# Patient Record
Sex: FEMALE | Race: WHITE | NOT HISPANIC OR LATINO | Employment: OTHER | URBAN - METROPOLITAN AREA
[De-identification: names, ages, dates, MRNs, and addresses within clinical notes are randomized per-mention and may not be internally consistent; named-entity substitution may affect disease eponyms.]

---

## 2020-07-22 ENCOUNTER — HOSPITAL ENCOUNTER (EMERGENCY)
Facility: MEDICAL CENTER | Age: 55
End: 2020-07-22
Attending: EMERGENCY MEDICINE

## 2020-07-22 VITALS
BODY MASS INDEX: 22.29 KG/M2 | WEIGHT: 147.05 LBS | HEIGHT: 68 IN | SYSTOLIC BLOOD PRESSURE: 107 MMHG | TEMPERATURE: 97.5 F | DIASTOLIC BLOOD PRESSURE: 87 MMHG | OXYGEN SATURATION: 96 % | RESPIRATION RATE: 20 BRPM | HEART RATE: 83 BPM

## 2020-07-22 DIAGNOSIS — T78.40XA ALLERGIC REACTION, INITIAL ENCOUNTER: ICD-10-CM

## 2020-07-22 DIAGNOSIS — L50.9 HIVES: ICD-10-CM

## 2020-07-22 PROCEDURE — 96374 THER/PROPH/DIAG INJ IV PUSH: CPT

## 2020-07-22 PROCEDURE — 700111 HCHG RX REV CODE 636 W/ 250 OVERRIDE (IP): Performed by: EMERGENCY MEDICINE

## 2020-07-22 PROCEDURE — 99284 EMERGENCY DEPT VISIT MOD MDM: CPT

## 2020-07-22 PROCEDURE — 36415 COLL VENOUS BLD VENIPUNCTURE: CPT

## 2020-07-22 PROCEDURE — 96375 TX/PRO/DX INJ NEW DRUG ADDON: CPT

## 2020-07-22 RX ORDER — DEXAMETHASONE SODIUM PHOSPHATE 10 MG/ML
10 INJECTION, SOLUTION INTRAMUSCULAR; INTRAVENOUS ONCE
Status: COMPLETED | OUTPATIENT
Start: 2020-07-22 | End: 2020-07-22

## 2020-07-22 RX ORDER — M-VIT,TX,IRON,MINS/CALC/FOLIC 27MG-0.4MG
1 TABLET ORAL DAILY
COMMUNITY

## 2020-07-22 RX ORDER — EPINEPHRINE 0.3 MG/.3ML
0.3 INJECTION SUBCUTANEOUS
Qty: 1 EACH | Refills: 10 | Status: SHIPPED | OUTPATIENT
Start: 2020-07-22 | End: 2021-07-21 | Stop reason: SDUPTHER

## 2020-07-22 RX ADMIN — FAMOTIDINE 40 MG: 10 INJECTION, SOLUTION INTRAVENOUS at 20:12

## 2020-07-22 RX ADMIN — DEXAMETHASONE SODIUM PHOSPHATE 10 MG: 10 INJECTION INTRAMUSCULAR; INTRAVENOUS at 20:14

## 2020-07-22 SDOH — HEALTH STABILITY: MENTAL HEALTH: HOW MANY STANDARD DRINKS CONTAINING ALCOHOL DO YOU HAVE ON A TYPICAL DAY?: 1 OR 2

## 2020-07-22 SDOH — HEALTH STABILITY: MENTAL HEALTH: HOW OFTEN DO YOU HAVE A DRINK CONTAINING ALCOHOL?: 2-4 TIMES A MONTH

## 2020-07-23 NOTE — ED TRIAGE NOTES
"Chief Complaint   Patient presents with   • Allergic Reaction     thinks she was bitten by a bug on right shin and now having allergic reaction. rash to armpits, chest pressure.   • Bug Bite     ED Triage Vitals [07/22/20 1938]   Enc Vitals Group      Blood Pressure 139/107      Pulse (!) 101      Respiration 20      Temperature 36.4 °C (97.5 °F)      Temp src Temporal      Pulse Oximetry 97 %      Weight 66.7 kg (147 lb 0.8 oz)      Height 1.727 m (5' 8\")     Took 2 benadryl 30 min PTA.  "

## 2020-07-23 NOTE — ED PROVIDER NOTES
ED Provider Note        Primary care provider: No primary care provider on file.    I verified that the patient was wearing a mask and I was wearing appropriate PPE every time I entered the room. The patient's mask was on the patient at all times during my encounter except for a brief view of the oropharynx.      CHIEF COMPLAINT  Chief Complaint   Patient presents with   • Allergic Reaction     thinks she was bitten by a bug on right shin and now having allergic reaction. rash to armpits, chest pressure.   • Bug Bite       HPI  Tomeka Aquino is a 54 y.o. female who presents to the Emergency Department with chief complaint of allergic reaction.  Patient was walking through the grass this evening she felt a sting on the anterior aspect of her right lower extremity.  Immediate pain and minor swelling at this site patient then began to have pruritus over her upper legs into her abdomen chest into her upper extremities.  She stated that she felt somewhat short of breath in presentation here but it is improving at this time she took 2 Benadryl prior to arrival.  No difficulty swallowing no difficulty breathing.  No previous history of insect allergies no food or drug allergies no other acute symptoms or concerns at this time.    REVIEW OF SYSTEMS  10 systems reviewed and otherwise negative, pertinent positives and negatives listed in the history of present illness.    PAST MEDICAL HISTORY   Patient denies    SURGICAL HISTORY  patient denies any surgical history    SOCIAL HISTORY  Social History     Tobacco Use   • Smoking status: Current Every Day Smoker     Packs/day: 0.25     Types: Cigarettes   • Smokeless tobacco: Never Used   Substance Use Topics   • Alcohol use: Yes     Frequency: 2-4 times a month     Drinks per session: 1 or 2   • Drug use: Never      Social History     Substance and Sexual Activity   Drug Use Never       FAMILY HISTORY  Non-Contributory    CURRENT MEDICATIONS  Home Medications      "Reviewed by Eliot Rosado R.N. (Registered Nurse) on 07/22/20 at 1955  Med List Status: Complete   Medication Last Dose Status   therapeutic multivitamin-minerals (THERAGRAN-M) Tab 7/22/2020 Active                ALLERGIES  No Known Allergies    PHYSICAL EXAM  VITAL SIGNS: /107   Pulse (!) 101   Temp 36.4 °C (97.5 °F) (Temporal)   Resp 20   Ht 1.727 m (5' 8\")   Wt 66.7 kg (147 lb 0.8 oz)   SpO2 97%   BMI 22.36 kg/m²   Pulse ox interpretation: I interpret this pulse ox as normal.  Constitutional: Alert and oriented x 3, minimal distress  HEENT: Atraumatic normocephalic, pupils are equal round reactive to light extraocular movements are intact. The nares is clear, external ears are normal, mouth shows moist mucous membranes  Neck: Supple, no JVD no tracheal deviation  Cardiovascular: Regular rate and rhythm no murmur rub or gallop 2+ pulses peripherally x4  Thorax & Lungs: No respiratory distress, no wheezes rales or rhonchi, No chest tenderness.   GI: Soft nontender nondistended positive bowel sounds, no peritoneal signs  Skin: Patient has approximately 10 cm area of slight induration and erythema over the right anterior shin.  She has multiple areas in the proximal thighs chest abdomen trunk and upper extremities with erythematous slightly raised lesions with central clearing and multiple areas of confluence.  No involvement of mucosal surfaces no involvement of the palms of the hands and the soles of the feet.  Musculoskeletal: Moving all extremities with full range and 5 of 5 strength, no acute  deformity  Neurologic: Cranial nerves III through XII are grossly intact, no sensory deficit, no cerebellar dysfunction   Psychiatric: Appropriate affect for situation at this time      DIAGNOSTIC STUDIES / PROCEDURES        COURSE & MEDICAL DECISION MAKING  Pertinent Labs & Imaging studies reviewed. (See chart for details)    8:18 PM - Patient seen and examined at bedside.     Patient noted to have " "slightly elevated blood pressure likely circumstantial secondary to presenting complaint. Referred to primary care physician for further evaluation.      Medical Decision Making: Patient had slight tachycardia at arrival that resolved spontaneously she took 50 mg of Benadryl prior to arrival she is given 40 mg IV Pepcid 10 mg of Decadron she was observed for 1 hour and had complete resolution of symptoms.  Given instructions return for worsening pain swelling redness shortness of breath difficulty breathing swallowing any other acute symptoms or concerns otherwise discharged in stable and improved condition.  Being that the insect was not visualized unknown she also has no other known allergies patient will be given a epinephrine autoinjector instructed on the use and indications to return to the emergency department should she have her have to use it follow-up with primary care for possible allergy testing discharged in stable and improved condition.    /87   Pulse 83   Temp 36.4 °C (97.5 °F) (Temporal)   Resp 20   Ht 1.727 m (5' 8\")   Wt 66.7 kg (147 lb 0.8 oz)   SpO2 96%   BMI 22.36 kg/m²     [address] [address]  [phone]  Schedule an appointment as soon as possible for a visit   for establishment of primary care, for blood pressure management, for possible allergy testing    [facility], Emergency Dept  [address]  [facility] [address] [phone]    If symptoms worsen      New Prescriptions    No medications on file       FINAL IMPRESSION  1. Allergic reaction, initial encounter Active   2. Hives Active         This dictation has been created using voice recognition software and/or scribes. The accuracy of the dictation is limited by the abilities of the software and the expertise of the scribes. I expect there may be some errors of grammar and possibly content. I made every attempt to manually correct the errors " within my dictation. However, errors related to voice recognition software and/or scribes may still exist and should be interpreted within the appropriate context.

## 2020-07-23 NOTE — ED NOTES
Assumed patient care. Pt assesement done.  Plan of care reviewed with patient. IV established. Blood sent to lab. Pt medicated per ERP orders.

## 2021-01-08 ENCOUNTER — TELEPHONE (OUTPATIENT)
Dept: HEALTH INFORMATION MANAGEMENT | Facility: OTHER | Age: 56
End: 2021-01-08

## 2021-02-03 ENCOUNTER — OFFICE VISIT (OUTPATIENT)
Dept: MEDICAL GROUP | Facility: MEDICAL CENTER | Age: 56
End: 2021-02-03
Attending: FAMILY MEDICINE
Payer: MEDICAID

## 2021-02-03 VITALS
SYSTOLIC BLOOD PRESSURE: 112 MMHG | TEMPERATURE: 97.1 F | BODY MASS INDEX: 23.85 KG/M2 | HEIGHT: 69 IN | HEART RATE: 74 BPM | WEIGHT: 161 LBS | RESPIRATION RATE: 16 BRPM | OXYGEN SATURATION: 97 % | DIASTOLIC BLOOD PRESSURE: 78 MMHG

## 2021-02-03 DIAGNOSIS — Z12.31 ENCOUNTER FOR SCREENING MAMMOGRAM FOR MALIGNANT NEOPLASM OF BREAST: ICD-10-CM

## 2021-02-03 DIAGNOSIS — Z23 NEED FOR VACCINATION: ICD-10-CM

## 2021-02-03 DIAGNOSIS — R10.12 LEFT UPPER QUADRANT ABDOMINAL PAIN: ICD-10-CM

## 2021-02-03 DIAGNOSIS — Z00.00 HEALTHCARE MAINTENANCE: ICD-10-CM

## 2021-02-03 DIAGNOSIS — R42 DIZZINESS: ICD-10-CM

## 2021-02-03 DIAGNOSIS — Z11.59 NEED FOR HEPATITIS C SCREENING TEST: ICD-10-CM

## 2021-02-03 DIAGNOSIS — Z12.11 SCREENING FOR COLON CANCER: ICD-10-CM

## 2021-02-03 PROCEDURE — 99203 OFFICE O/P NEW LOW 30 MIN: CPT | Performed by: FAMILY MEDICINE

## 2021-02-03 PROCEDURE — 90686 IIV4 VACC NO PRSV 0.5 ML IM: CPT | Performed by: FAMILY MEDICINE

## 2021-02-03 RX ORDER — FAMOTIDINE 20 MG/1
20 TABLET, FILM COATED ORAL 2 TIMES DAILY
Qty: 60 TAB | Refills: 2 | Status: SHIPPED | OUTPATIENT
Start: 2021-02-03 | End: 2022-03-28

## 2021-02-03 ASSESSMENT — PATIENT HEALTH QUESTIONNAIRE - PHQ9: CLINICAL INTERPRETATION OF PHQ2 SCORE: 0

## 2021-02-03 NOTE — ASSESSMENT & PLAN NOTE
"1 episode of dizziness most days for 2 weeks 2 months ago, has not had it since then  \"heat in my stomach, into my throat, and then my brain was moving around\"  Room spinning, very slight lightheadedness  Worse with moving the head   Each episode would last a couple of hours     "

## 2021-02-03 NOTE — PROGRESS NOTES
"Subjective:     CC:    Chief Complaint   Patient presents with   • Establish Care       HISTORY OF THE PRESENT ILLNESS: Patient is a 55 y.o. female. This pleasant patient is here today to establish care and discuss the following issues.   No prior PCP - last went 5 years ago     Dizziness  1 episode of dizziness most days for 2 weeks 2 months ago, has not had it since then  \"heat in my stomach, into my throat, and then my brain was moving around\"  Room spinning, very slight lightheadedness  Worse with moving the head   Each episode would last a couple of hours       Left upper quadrant abdominal pain  Lots of \"gurgling\" and \"moving around\", not pain.   Sometimes occurs with eating       Last PAP - 10 years ago, normal  Last mammo - 10 years ago, normal  Last c-scope - never    Allergies: Patient has no known allergies.    Current Outpatient Medications Ordered in Epic   Medication Sig Dispense Refill   • famotidine (PEPCID) 20 MG Tab Take 1 Tab by mouth 2 times a day. 60 Tab 2   • therapeutic multivitamin-minerals (THERAGRAN-M) Tab Take 1 Tab by mouth every day.     • EPINEPHrine (EPIPEN) 0.3 MG/0.3ML Solution Auto-injector solution for injection 0.3 mL by Intramuscular route Once PRN (anaphylaxis) for up to 1 dose. 1 Each 10     No current [facility]-ordered facility-administered medications on file.        History reviewed. No pertinent past medical history.    Past Surgical History:   Procedure Laterality Date   • ADENOIDECTOMY  1982       Social History     Tobacco Use   • Smoking status: Former Smoker     Packs/day: 0.50     Years: 10.00     Pack years: 5.00     Types: Cigarettes   • Smokeless tobacco: Never Used   Substance Use Topics   • Alcohol use: Yes     Frequency: 2-4 times a month     Drinks per session: 1 or 2   • Drug use: Never       Social History     Social History Narrative   • Not on file       Family History   Problem Relation Age of Onset   • Hypertension Father    • Stroke Father    • Hyperlipidemia " "Father    • Cancer Maternal Uncle         pancreatic    • Diabetes Neg Hx          ROS:   Gen: no fevers/chills  Eyes: poor vision in R eye   ENT: no sore throat  Pulm: no sob, no cough  CV: no chest pain, no lower extremity edema  GI: (+) mild upper abd dscomfort   : no dysuria  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    General: Normal appearing. No distress.  Head: normocephalic  Eyes:  Eyes conjunctiva clear lids without ptosis, pupils equal and reactive to light accommodation  ENT: Ears normal shape and contour, canals are clear on the left, mild-mod wax on the R so R TM not visualized, L tympanic membrane is benign  Neck: Supple. Thyroid is not enlarged.  Pulmonary: Clear to ausculation.  Normal effort. No rales, ronchi, or wheezing.  Cardiovascular: Regular rate and rhythm without murmur. Radial pulses are intact and equal bilaterally.  Abdomen: Soft, nontender, nondistended. Normal bowel sounds.  Neurologic: no facial droop, gait normal  Lymph: No cervical or supraclavicular lymph nodes are palpable  Skin: Warm and dry.  No obvious lesions.  Musculoskeletal: Normal gait. No extremity cyanosis, clubbing, or edema.  Psych: Normal mood and affect. Alert and oriented x3. Judgment and insight is normal.      Labs:   None to review    Assessment & Plan:   55 y.o. female with the following -    1. Dizziness  Based on limited nature of symptoms, sounded like it vertigo, possibly viral labyrinthitis. She states she has not had any further issues.     2. Left upper quadrant abdominal pain  - famotidine (PEPCID) 20 MG Tab; Take 1 Tab by mouth 2 times a day.  Dispense: 60 Tab; Refill: 2  Gastritis vs " colonic in origin. Trial of pepcid since she has some symptoms with food.     3. Healthcare maintenance  - CBC WITHOUT DIFFERENTIAL; Future  - Basic Metabolic Panel; Future  - Lipid Profile; Future  - HEMOGLOBIN A1C; Future  - TSH WITH REFLEX TO FT4; Future  - HIV AG/AB COMBO ASSAY DIAGNOSTIC; Future    4. Need for hepatitis C screening test  - HEP C VIRUS ANTIBODY; Future    5. Encounter for screening mammogram for malignant neoplasm of breast  - MA-SCREENING MAMMO BILAT W/TOMOSYNTHESIS W/CAD; Future    6. Screening for colon cancer  - OCCULT BLOOD FECES IMMUNOASSAY; Future    7. Need for vaccination  - Influenza Vaccine Quad Injection (PF)      Return in about 6 weeks (around 3/17/2021) for pap, labs review.    Please note that this dictation was created using voice recognition software. I have made every reasonable attempt to correct obvious errors, but I expect that there are errors of grammar and possibly content that I did not discover before finalizing the note.

## 2021-03-06 ENCOUNTER — HOSPITAL ENCOUNTER (OUTPATIENT)
Dept: RADIOLOGY | Facility: MEDICAL CENTER | Age: 56
End: 2021-03-06
Attending: FAMILY MEDICINE
Payer: MEDICAID

## 2021-03-06 DIAGNOSIS — Z12.31 ENCOUNTER FOR SCREENING MAMMOGRAM FOR MALIGNANT NEOPLASM OF BREAST: ICD-10-CM

## 2021-03-06 PROCEDURE — 77063 BREAST TOMOSYNTHESIS BI: CPT

## 2021-03-17 ENCOUNTER — HOSPITAL ENCOUNTER (OUTPATIENT)
Facility: MEDICAL CENTER | Age: 56
End: 2021-03-17
Attending: FAMILY MEDICINE
Payer: MEDICAID

## 2021-03-17 ENCOUNTER — OFFICE VISIT (OUTPATIENT)
Dept: MEDICAL GROUP | Facility: MEDICAL CENTER | Age: 56
End: 2021-03-17
Attending: FAMILY MEDICINE
Payer: MEDICAID

## 2021-03-17 VITALS
WEIGHT: 150.7 LBS | BODY MASS INDEX: 22.32 KG/M2 | HEART RATE: 100 BPM | OXYGEN SATURATION: 94 % | SYSTOLIC BLOOD PRESSURE: 108 MMHG | RESPIRATION RATE: 16 BRPM | TEMPERATURE: 98 F | HEIGHT: 69 IN | DIASTOLIC BLOOD PRESSURE: 68 MMHG

## 2021-03-17 DIAGNOSIS — Z12.4 CERVICAL CANCER SCREENING: ICD-10-CM

## 2021-03-17 PROCEDURE — 88175 CYTOPATH C/V AUTO FLUID REDO: CPT

## 2021-03-17 PROCEDURE — 87624 HPV HI-RISK TYP POOLED RSLT: CPT

## 2021-03-17 PROCEDURE — 87591 N.GONORRHOEAE DNA AMP PROB: CPT

## 2021-03-17 PROCEDURE — 99213 OFFICE O/P EST LOW 20 MIN: CPT | Performed by: FAMILY MEDICINE

## 2021-03-17 PROCEDURE — 87491 CHLMYD TRACH DNA AMP PROBE: CPT

## 2021-03-17 NOTE — PROGRESS NOTES
"Subjective:     CC: No chief complaint on file.        HPI:     Patient presents today for pap smear only.     History reviewed. No pertinent past medical history.    Social History     Tobacco Use   • Smoking status: Former Smoker     Packs/day: 0.50     Years: 10.00     Pack years: 5.00     Types: Cigarettes   • Smokeless tobacco: Never Used   Substance Use Topics   • Alcohol use: Yes   • Drug use: Never       Current Outpatient Medications Ordered in Epic   Medication Sig Dispense Refill   • famotidine (PEPCID) 20 MG Tab Take 1 Tab by mouth 2 times a day. 60 Tab 2   • therapeutic multivitamin-minerals (THERAGRAN-M) Tab Take 1 Tab by mouth every day.     • EPINEPHrine (EPIPEN) 0.3 MG/0.3ML Solution Auto-injector solution for injection 0.3 mL by Intramuscular route Once PRN (anaphylaxis) for up to 1 dose. 1 Each 10     No current [facility]-ordered facility-administered medications on file.       Allergies:  Patient has no known allergies.        Objective:       Exam:  /68 (BP Location: Left arm, Patient Position: Sitting, BP Cuff Size: Adult)   Pulse 100   Temp 36.7 °C (98 °F) (Temporal)   Resp 16   Ht 1.753 m (5' 9\")   Wt 68.4 kg (150 lb 11.2 oz)   SpO2 94%   BMI 22.25 kg/m²  Body mass index is 22.25 kg/m².    Gen: No apparent distress.  Pelvic exam:  Perineum: No external lesions are noted, color is symmetrical throughout  Vagina: Vaginal vault is well rugated.  Cervix: nonfriable  Uterus: Normal shape, position and consistency  Bimanual: no adnexal masses or tenderness    Pap was performed and sent to the lab    Derm: Warm and dry. No visible rashes  Psy: Alert and oriented, Normal mood and affect. Judgment normal        Assessment & Plan:     55 y.o. female with the following -     1. Cervical cancer screening  - THINPREP PAP W/HPV AND CTNG; Future  Pap done today and sent to lab.     Return if symptoms worsen or fail to improve.    Please note that this dictation was created using voice recognition " software. I have made every reasonable attempt to correct obvious errors, but I expect that there are errors of grammar and possibly content that I did not discover before finalizing the note.

## 2021-03-18 DIAGNOSIS — Z12.4 CERVICAL CANCER SCREENING: ICD-10-CM

## 2021-03-19 DIAGNOSIS — N76.0 BV (BACTERIAL VAGINOSIS): ICD-10-CM

## 2021-03-19 DIAGNOSIS — B96.89 BV (BACTERIAL VAGINOSIS): ICD-10-CM

## 2021-03-19 LAB
C TRACH DNA GENITAL QL NAA+PROBE: NEGATIVE
CYTOLOGY REG CYTOL: ABNORMAL
HPV HR 12 DNA CVX QL NAA+PROBE: POSITIVE
HPV16 DNA SPEC QL NAA+PROBE: NEGATIVE
HPV18 DNA SPEC QL NAA+PROBE: NEGATIVE
N GONORRHOEA DNA GENITAL QL NAA+PROBE: NEGATIVE
SPECIMEN SOURCE: ABNORMAL
SPECIMEN SOURCE: ABNORMAL

## 2021-03-19 RX ORDER — METRONIDAZOLE 500 MG/1
500 TABLET ORAL 2 TIMES DAILY
Qty: 14 TABLET | Refills: 0 | Status: SHIPPED
Start: 2021-03-19 | End: 2021-03-26

## 2021-04-14 ENCOUNTER — TELEPHONE (OUTPATIENT)
Dept: MEDICAL GROUP | Facility: MEDICAL CENTER | Age: 56
End: 2021-04-14

## 2021-04-14 NOTE — TELEPHONE ENCOUNTER
1. Caller Name: Tomeka Aquino                        Call Back Number: [phone] (home)       How would the patient prefer to be contacted with a response: Phone call OK to leave a detailed message    Pt left a VM. Pt is getting her Covid Vaccine on Monday at 11 am. She wants to know if she is safe to get the vaccine because of her allergies.   .

## 2021-07-21 ENCOUNTER — OFFICE VISIT (OUTPATIENT)
Dept: MEDICAL GROUP | Facility: MEDICAL CENTER | Age: 56
End: 2021-07-21
Attending: FAMILY MEDICINE
Payer: MEDICAID

## 2021-07-21 VITALS
HEART RATE: 70 BPM | SYSTOLIC BLOOD PRESSURE: 106 MMHG | WEIGHT: 152 LBS | HEIGHT: 69 IN | OXYGEN SATURATION: 94 % | TEMPERATURE: 98.1 F | BODY MASS INDEX: 22.51 KG/M2 | DIASTOLIC BLOOD PRESSURE: 64 MMHG | RESPIRATION RATE: 18 BRPM

## 2021-07-21 DIAGNOSIS — B35.1 ONYCHOMYCOSIS: ICD-10-CM

## 2021-07-21 DIAGNOSIS — Z87.892 HISTORY OF ANAPHYLAXIS: ICD-10-CM

## 2021-07-21 DIAGNOSIS — M25.521 BILATERAL ELBOW JOINT PAIN: ICD-10-CM

## 2021-07-21 DIAGNOSIS — F43.9 STRESS: ICD-10-CM

## 2021-07-21 DIAGNOSIS — M25.522 BILATERAL ELBOW JOINT PAIN: ICD-10-CM

## 2021-07-21 PROCEDURE — 99213 OFFICE O/P EST LOW 20 MIN: CPT | Performed by: FAMILY MEDICINE

## 2021-07-21 PROCEDURE — 99214 OFFICE O/P EST MOD 30 MIN: CPT | Performed by: FAMILY MEDICINE

## 2021-07-21 RX ORDER — EPINEPHRINE 0.3 MG/.3ML
0.3 INJECTION SUBCUTANEOUS
Qty: 1 EACH | Refills: 10 | Status: SHIPPED | OUTPATIENT
Start: 2021-07-21

## 2021-07-21 RX ORDER — MELOXICAM 7.5 MG/1
7.5 TABLET ORAL DAILY
Qty: 30 TABLET | Refills: 1 | Status: SHIPPED | OUTPATIENT
Start: 2021-07-21 | End: 2022-03-28

## 2021-07-21 RX ORDER — TERBINAFINE HYDROCHLORIDE 250 MG/1
250 TABLET ORAL DAILY
Qty: 84 TABLET | Refills: 0 | Status: SHIPPED
Start: 2021-07-21 | End: 2022-03-28

## 2021-07-21 NOTE — PATIENT INSTRUCTIONS
Purchase VOLTAREN gel (active incredient is diclofenac)  Use 2-3 times a day     Purchase band for epicondylitis

## 2021-07-21 NOTE — PROGRESS NOTES
Subjective:     CC:   Chief Complaint   Patient presents with   • Arm Pain     bilateral elbow pain 10/10 numbness, tingling, burning         HPI:     Bilateral elbow joint pain  Pt reports b/l elbow pain since the end of march  She states she has been doing some extra stretches since then, but it has not been helping. Bicep curls without weights.   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 Does report a burning sensation on ulnar side of forearm   No pain above the elbows  This has never happened to her before       Onychomycosis  Patient has thickened, yellow toenails on all toenails. It does hurt her left great toe. She has tried topical OTC treatments for many months but have not helped.     Patient has been having signifcant stressors in her life and would like to set up care with a counselor    Pt concerned about her history of anaphylaxis to wasp sting and risk of anaphylaxis with getting a covid vaccine    No past medical history on file.    Social History     Tobacco Use   • Smoking status: Former Smoker     Packs/day: 0.50     Years: 10.00     Pack years: 5.00     Types: Cigarettes   • Smokeless tobacco: Never Used   Vaping Use   • Vaping Use: Never used   Substance Use Topics   • Alcohol use: Yes   • Drug use: Never       Current Outpatient Medications Ordered in Epic   Medication Sig Dispense Refill   • meloxicam (MOBIC) 7.5 MG Tab Take 1 tablet by mouth every day. 30 tablet 1   • terbinafine (LAMISIL) 250 MG Tab Take 1 tablet by mouth every day. 84 tablet 0   • EPINEPHrine (EPIPEN) 0.3 MG/0.3ML Solution Auto-injector solution for injection Inject 0.3 mL into the shoulder, thigh, or buttocks one time as needed (anaphylaxis) for up to 1 dose. 1 Each 10   •  "famotidine (PEPCID) 20 MG Tab Take 1 Tab by mouth 2 times a day. 60 Tab 2   • therapeutic multivitamin-minerals (THERAGRAN-M) Tab Take 1 Tab by mouth every day.       No current [facility]-ordered facility-administered medications on file.       Allergies:  Wasp venom    ROS:  MSk: b/l elbow pain per [facility]  Neuro: numbness/tingling in forearms      Objective:       Exam:  /64 (BP Location: Right arm, Patient Position: Sitting, BP Cuff Size: Adult)   Pulse 70   Temp 36.7 °C (98.1 °F) (Temporal)   Resp 18   Ht 1.753 m (5' 9\")   Wt 68.9 kg (152 lb)   SpO2 94%   BMI 22.45 kg/m²  Body mass index is 22.45 kg/m².    Gen: No apparent distress.  MSK:   Elbow: No deformity, swelling or bruising noted. Tenderness to palpation on medial epicondyle, slight tenderness on the lateral epicondyle. Full range of motion bilaterally. 2+ biceps, triceps and brachioradialis deep tendon reflexes bilaterally. 5/5 strength bilaterally.  Significant pain to the medial epicondyle with resisted wrist flexion, wrist extension, ulnar deviation at the wrist   Skin: Thickened, yellow toenails on all nails           Labs:   None new    Assessment & Plan:     55 y.o. female with the following -     1. Bilateral elbow joint pain  - meloxicam (MOBIC) 7.5 MG Tab; Take 1 tablet by mouth every day.  Dispense: 30 tablet; Refill: 1  Take mobic daily to help for antiinflammatory effect with food  Also advised OTC voltaren gel  Exercises given. Obtain an epicondylitis compression band  May also have component of ulnar nerve compression given her distribution of numbness  Pt given expected course, come back if no improvement in 3 months, can consider injections     2. Onychomycosis  - terbinafine (LAMISIL) 250 MG Tab; Take 1 tablet by mouth every day.  Dispense: 84 tablet; Refill: 0  Pt counseled on potential side effects  Onychomycosis that causing pain    3. History of anaphylaxis  - EPINEPHrine (EPIPEN) 0.3 MG/0.3ML Solution Auto-injector solution for " injection; Inject 0.3 mL into the shoulder, thigh, or buttocks one time as needed (anaphylaxis) for up to 1 dose.  Dispense: 1 Each; Refill: 10    4. Stress  - REFERRAL TO PSYCHOLOGY      Return if symptoms worsen or fail to improve.    Please note that this dictation was created using voice recognition software. I have made every reasonable attempt to correct obvious errors, but I expect that there are errors of grammar and possibly content that I did not discover before finalizing the note.

## 2021-07-21 NOTE — ASSESSMENT & PLAN NOTE
Patient has thickened, yellow toenails on all toenails. It does hurt her left great toe. She has tried topical OTC treatments for many months but have not helped.

## 2021-09-29 ENCOUNTER — OFFICE VISIT (OUTPATIENT)
Dept: MEDICAL GROUP | Facility: MEDICAL CENTER | Age: 56
End: 2021-09-29
Attending: NURSE PRACTITIONER
Payer: MEDICAID

## 2021-09-29 VITALS
RESPIRATION RATE: 16 BRPM | TEMPERATURE: 96.8 F | HEART RATE: 82 BPM | SYSTOLIC BLOOD PRESSURE: 98 MMHG | OXYGEN SATURATION: 98 % | WEIGHT: 149.4 LBS | DIASTOLIC BLOOD PRESSURE: 62 MMHG | HEIGHT: 69 IN | BODY MASS INDEX: 22.13 KG/M2

## 2021-09-29 DIAGNOSIS — T31.0 BURNS INVOLVING LESS THAN 10% OF BODY SURFACE: ICD-10-CM

## 2021-09-29 PROCEDURE — 99213 OFFICE O/P EST LOW 20 MIN: CPT | Performed by: NURSE PRACTITIONER

## 2021-09-29 RX ORDER — HYDROCODONE BITARTRATE AND ACETAMINOPHEN 5; 325 MG/1; MG/1
1-2 TABLET ORAL EVERY 4 HOURS PRN
Qty: 28 TABLET | Refills: 0 | Status: SHIPPED | OUTPATIENT
Start: 2021-09-29 | End: 2021-10-06

## 2021-09-29 RX ORDER — GINSENG 100 MG
1 CAPSULE ORAL 2 TIMES DAILY
Qty: 28 G | Refills: 2 | Status: SHIPPED | OUTPATIENT
Start: 2021-09-29 | End: 2022-03-28

## 2021-09-29 ASSESSMENT — ENCOUNTER SYMPTOMS
PALPITATIONS: 0
WHEEZING: 0
COUGH: 0
MYALGIAS: 1
DIARRHEA: 0
ABDOMINAL PAIN: 0
BLOOD IN STOOL: 0
SHORTNESS OF BREATH: 0
WEIGHT LOSS: 0
BACK PAIN: 1
FEVER: 0
CHILLS: 0
CONSTIPATION: 0
NECK PAIN: 1
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
## 2021-10-08 ENCOUNTER — APPOINTMENT (OUTPATIENT)
Dept: WOUND CARE | Facility: MEDICAL CENTER | Age: 56
End: 2021-10-08
Attending: NURSE PRACTITIONER
Payer: MEDICAID

## 2021-12-02 ENCOUNTER — OFFICE VISIT (OUTPATIENT)
Dept: MEDICAL GROUP | Facility: MEDICAL CENTER | Age: 56
End: 2021-12-02
Attending: FAMILY MEDICINE
Payer: MEDICAID

## 2021-12-02 VITALS
OXYGEN SATURATION: 96 % | HEIGHT: 68 IN | BODY MASS INDEX: 22.69 KG/M2 | DIASTOLIC BLOOD PRESSURE: 64 MMHG | HEART RATE: 76 BPM | RESPIRATION RATE: 16 BRPM | WEIGHT: 149.7 LBS | SYSTOLIC BLOOD PRESSURE: 100 MMHG | TEMPERATURE: 97.6 F

## 2021-12-02 DIAGNOSIS — R06.09 OTHER FORM OF DYSPNEA: ICD-10-CM

## 2021-12-02 DIAGNOSIS — M54.6 CHRONIC MIDLINE THORACIC BACK PAIN: ICD-10-CM

## 2021-12-02 DIAGNOSIS — G89.29 CHRONIC MIDLINE THORACIC BACK PAIN: ICD-10-CM

## 2021-12-02 PROCEDURE — 99213 OFFICE O/P EST LOW 20 MIN: CPT | Performed by: FAMILY MEDICINE

## 2021-12-02 NOTE — PROGRESS NOTES
"Subjective     Tomeka Aquino is a 56 y.o. female who presents with Pain (back, 1.5 years)            HPI 1.  Chronic midline thoracic pain-patient reports an 18-month history of pain in the middle portion of her upper spine.  She reports that this is worse in the mornings.  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  She is not reporting any numbness or weakness in either upper extremity.  Not reporting any neck pain  2.  Dyspnea-patient reports when she first wakes up in the morning she will feel short of breath for several breasts and then after taking several deep breaths breathing seems to normalize.  She does not notice any accompanying wheezing does not have a daily cough.  She discontinued tobacco use about 10 years ago.    ROS negative for hematuria, dysuria, abdominal pain           Objective     /64 (BP Location: Left arm, Patient Position: Sitting, BP Cuff Size: Adult)   Pulse 76   Temp 36.4 °C (97.6 °F) (Temporal)   Resp 16   Ht 1.727 m (5' 8\")   Wt 67.9 kg (149 lb 11.2 oz)   SpO2 96%   BMI 22.76 kg/m²      Physical Exam      Gen.- alert, cooperative, in no acute distress  Neck- midline trachea, thyroid not enlarged or tender,supple, no cervical adenopathy. Neck nontender to palpation over the posterior midline  Chest-clear to auscultation and percussion with normal breath sounds. No retractions. Chest wall nontender  Cardiac- regular rhythm and rate. No murmur, thrill, or heave  Back-nontender to palpation over the bony midline and paraspinous areas. Area of indicated tenderness is over the midline from about T6-T9. Overlying skin is normal with a few scattered small cherry hemangiomas                   Assessment & Plan        1. Chronic midline thoracic back pain      2. Other form of dyspnea      Plan: 1.  X-rays of the thoracic spine and lung x-ray  2.  Physical therapy referral           "

## 2021-12-13 ENCOUNTER — HOSPITAL ENCOUNTER (OUTPATIENT)
Dept: RADIOLOGY | Facility: MEDICAL CENTER | Age: 56
End: 2021-12-13
Attending: FAMILY MEDICINE
Payer: MEDICAID

## 2021-12-13 DIAGNOSIS — M54.6 CHRONIC MIDLINE THORACIC BACK PAIN: ICD-10-CM

## 2021-12-13 DIAGNOSIS — G89.29 CHRONIC MIDLINE THORACIC BACK PAIN: ICD-10-CM

## 2021-12-13 DIAGNOSIS — R06.09 OTHER FORM OF DYSPNEA: ICD-10-CM

## 2021-12-13 PROCEDURE — 71046 X-RAY EXAM CHEST 2 VIEWS: CPT

## 2021-12-13 PROCEDURE — 72070 X-RAY EXAM THORAC SPINE 2VWS: CPT

## 2021-12-14 ENCOUNTER — TELEPHONE (OUTPATIENT)
Dept: MEDICAL GROUP | Facility: MEDICAL CENTER | Age: 56
End: 2021-12-14

## 2021-12-14 NOTE — TELEPHONE ENCOUNTER
----- Message from Kelton Farley M.D. sent at 12/13/2021  6:26 PM PST -----  X-rays of the chest and thoracic spine are normal with no problems being seen

## 2021-12-14 NOTE — TELEPHONE ENCOUNTER
Phone Number Called: [phone] (home)     Call outcome: Did not leave a detailed message. Requested patient to call back.    Message: Attempted to notify patient of her result note, no answer, requested a c/b.

## 2022-03-28 ENCOUNTER — APPOINTMENT (OUTPATIENT)
Dept: RADIOLOGY | Facility: MEDICAL CENTER | Age: 57
End: 2022-03-28
Attending: EMERGENCY MEDICINE
Payer: MEDICAID

## 2022-03-28 ENCOUNTER — HOSPITAL ENCOUNTER (EMERGENCY)
Facility: MEDICAL CENTER | Age: 57
End: 2022-03-29
Attending: EMERGENCY MEDICINE
Payer: MEDICAID

## 2022-03-28 VITALS
RESPIRATION RATE: 15 BRPM | DIASTOLIC BLOOD PRESSURE: 91 MMHG | WEIGHT: 140 LBS | HEIGHT: 69 IN | TEMPERATURE: 98.2 F | HEART RATE: 81 BPM | SYSTOLIC BLOOD PRESSURE: 145 MMHG | OXYGEN SATURATION: 94 % | BODY MASS INDEX: 20.73 KG/M2

## 2022-03-28 DIAGNOSIS — R00.2 PALPITATIONS: ICD-10-CM

## 2022-03-28 DIAGNOSIS — F41.9 ANXIETY: ICD-10-CM

## 2022-03-28 LAB
ALBUMIN SERPL BCP-MCNC: 3.9 G/DL (ref 3.2–4.9)
ALBUMIN/GLOB SERPL: 1.6 G/DL
ALP SERPL-CCNC: 87 U/L (ref 30–99)
ALT SERPL-CCNC: 12 U/L (ref 2–50)
ANION GAP SERPL CALC-SCNC: 8 MMOL/L (ref 7–16)
AST SERPL-CCNC: 22 U/L (ref 12–45)
BASOPHILS # BLD AUTO: 0.8 % (ref 0–1.8)
BASOPHILS # BLD: 0.06 K/UL (ref 0–0.12)
BILIRUB SERPL-MCNC: 0.2 MG/DL (ref 0.1–1.5)
BUN SERPL-MCNC: 15 MG/DL (ref 8–22)
CALCIUM SERPL-MCNC: 8.7 MG/DL (ref 8.4–10.2)
CHLORIDE SERPL-SCNC: 108 MMOL/L (ref 96–112)
CO2 SERPL-SCNC: 24 MMOL/L (ref 20–33)
CREAT SERPL-MCNC: 1.17 MG/DL (ref 0.5–1.4)
EKG IMPRESSION: NORMAL
EOSINOPHIL # BLD AUTO: 0.28 K/UL (ref 0–0.51)
EOSINOPHIL NFR BLD: 3.6 % (ref 0–6.9)
ERYTHROCYTE [DISTWIDTH] IN BLOOD BY AUTOMATED COUNT: 40.9 FL (ref 35.9–50)
GFR SERPLBLD CREATININE-BSD FMLA CKD-EPI: 55 ML/MIN/1.73 M 2
GLOBULIN SER CALC-MCNC: 2.4 G/DL (ref 1.9–3.5)
GLUCOSE SERPL-MCNC: 91 MG/DL (ref 65–99)
HCT VFR BLD AUTO: 38.7 % (ref 37–47)
HGB BLD-MCNC: 12.6 G/DL (ref 12–16)
IMM GRANULOCYTES # BLD AUTO: 0.01 K/UL (ref 0–0.11)
IMM GRANULOCYTES NFR BLD AUTO: 0.1 % (ref 0–0.9)
LYMPHOCYTES # BLD AUTO: 2.77 K/UL (ref 1–4.8)
LYMPHOCYTES NFR BLD: 35.3 % (ref 22–41)
MCH RBC QN AUTO: 27.2 PG (ref 27–33)
MCHC RBC AUTO-ENTMCNC: 32.6 G/DL (ref 33.6–35)
MCV RBC AUTO: 83.4 FL (ref 81.4–97.8)
MONOCYTES # BLD AUTO: 0.4 K/UL (ref 0–0.85)
MONOCYTES NFR BLD AUTO: 5.1 % (ref 0–13.4)
NEUTROPHILS # BLD AUTO: 4.33 K/UL (ref 2–7.15)
NEUTROPHILS NFR BLD: 55.1 % (ref 44–72)
NRBC # BLD AUTO: 0 K/UL
NRBC BLD-RTO: 0 /100 WBC
PLATELET # BLD AUTO: 204 K/UL (ref 164–446)
PMV BLD AUTO: 11 FL (ref 9–12.9)
POTASSIUM SERPL-SCNC: 4 MMOL/L (ref 3.6–5.5)
PROT SERPL-MCNC: 6.3 G/DL (ref 6–8.2)
RBC # BLD AUTO: 4.64 M/UL (ref 4.2–5.4)
SODIUM SERPL-SCNC: 140 MMOL/L (ref 135–145)
TROPONIN T SERPL-MCNC: <6 NG/L (ref 6–19)
WBC # BLD AUTO: 7.9 K/UL (ref 4.8–10.8)

## 2022-03-28 PROCEDURE — 99284 EMERGENCY DEPT VISIT MOD MDM: CPT

## 2022-03-28 PROCEDURE — 36415 COLL VENOUS BLD VENIPUNCTURE: CPT

## 2022-03-28 PROCEDURE — 85025 COMPLETE CBC W/AUTO DIFF WBC: CPT

## 2022-03-28 PROCEDURE — 71045 X-RAY EXAM CHEST 1 VIEW: CPT

## 2022-03-28 PROCEDURE — 80053 COMPREHEN METABOLIC PANEL: CPT

## 2022-03-28 PROCEDURE — 84484 ASSAY OF TROPONIN QUANT: CPT

## 2022-03-28 PROCEDURE — 93005 ELECTROCARDIOGRAM TRACING: CPT | Performed by: EMERGENCY MEDICINE

## 2022-03-28 RX ORDER — LORAZEPAM 1 MG/1
1 TABLET ORAL ONCE
Status: COMPLETED | OUTPATIENT
Start: 2022-03-29 | End: 2022-03-29

## 2022-03-28 ASSESSMENT — LIFESTYLE VARIABLES
EVER HAD A DRINK FIRST THING IN THE MORNING TO STEADY YOUR NERVES TO GET RID OF A HANGOVER: NO
DO YOU DRINK ALCOHOL: NO
TOTAL SCORE: 0
CONSUMPTION TOTAL: INCOMPLETE
TOTAL SCORE: 0
EVER FELT BAD OR GUILTY ABOUT YOUR DRINKING: NO
HAVE YOU EVER FELT YOU SHOULD CUT DOWN ON YOUR DRINKING: NO
TOTAL SCORE: 0
HAVE PEOPLE ANNOYED YOU BY CRITICIZING YOUR DRINKING: NO

## 2022-03-29 PROCEDURE — A9270 NON-COVERED ITEM OR SERVICE: HCPCS | Performed by: EMERGENCY MEDICINE

## 2022-03-29 PROCEDURE — 700102 HCHG RX REV CODE 250 W/ 637 OVERRIDE(OP): Performed by: EMERGENCY MEDICINE

## 2022-03-29 RX ORDER — LORAZEPAM 1 MG/1
1 TABLET ORAL EVERY 8 HOURS PRN
Qty: 8 TABLET | Refills: 0 | Status: SHIPPED | OUTPATIENT
Start: 2022-03-28 | End: 2022-03-30

## 2022-03-29 RX ADMIN — LORAZEPAM 1 MG: 1 TABLET ORAL at 00:01

## 2022-03-29 NOTE — ED PROVIDER NOTES
ED Provider Note    CHIEF COMPLAINT  Chief Complaint   Patient presents with   • Palpitations     Pt presents to the ed from home via [facility] for palpations starting around 2130. Pt states around 1830 she touched a trash can and begin to have tingly fingers, ems was called then but didn't transport. Pt states then around 2130 she began to have palpitations and SOB    • Anxiety       HPI  Tomeka Aquino is a 56 y.o. female here for evaluations of heart palpitations.  Patient states that she has had increased amount of work over the last few days was sitting down to eat today when she started to notice some heart palpitations, and some fast breathing.  She states that both of her hands started to go numb, and she called EMS.  She had no chest pain, no abdominal pain and no back pain.  Patient states that at this time she feels much better now that she is here.  She states that she has a history of heart palpitations in the past, but never this feeling of anxiety, and never the bilateral hand numbness.  Patient not take anything prior to or for the same, and has no other medical concerns at this time.      ROS  See HPI for further details, o/w negative.     PAST MEDICAL HISTORY       SOCIAL HISTORY  Social History     Tobacco Use   • Smoking status: Former Smoker     Packs/day: 0.50     Years: 10.00     Pack years: 5.00     Types: Cigarettes   • Smokeless tobacco: Never Used   Vaping Use   • Vaping Use: Never used   Substance and Sexual Activity   • Alcohol use: Yes   • Drug use: Never   • Sexual activity: Not Currently     Partners: Male       Family History  No bleeding disorders    SURGICAL HISTORY   has a past surgical history that includes adenoidectomy (1982).    CURRENT MEDICATIONS  Home Medications     Reviewed by Adam Oneal R.N. (Registered Nurse) on 03/28/22 at 2224  Med List Status: Complete   Medication Last Dose Status   EPINEPHrine (EPIPEN) 0.3 MG/0.3ML Solution Auto-injector solution for  injection  Active   therapeutic multivitamin-minerals (THERAGRAN-M) Tab 3/27/2022 Active                ALLERGIES  Allergies   Allergen Reactions   • Wasp Venom Shortness of Breath     Couldn't breath and hives, went to ER       REVIEW OF SYSTEMS  See HPI for further details. Review of systems as above, otherwise all other systems are negative.     PHYSICAL EXAM  Constitutional: Well developed, well nourished. No acute distress.  HEENT: Normocephalic, atraumatic. Posterior pharynx clear and moist.  Eyes:  EOMI. Normal sclera.  Neck: Supple, Full range of motion, nontender.  Chest/Pulmonary: clear to ausculation. Symmetrical expansion.   Cardio: Regular rate and rhythm with no murmur.   Abdomen: Soft, nontender. No peritoneal signs. No guarding. No palpable masses.  Musculoskeletal: No deformity, no edema, neurovascular intact.   Neuro: Clear speech, appropriate, cooperative, cranial nerves II-XII grossly intact.  Psych: Anxious mood and affect    PROCEDURES     MEDICAL RECORD  I have reviewed patient's medical record and pertinent results are listed.    COURSE & MEDICAL DECISION MAKING  I have reviewed any medical record information, laboratory studies and radiographic results as noted above.    Results for orders placed or performed during the hospital encounter of 03/28/22   CBC w/ Differential   Result Value Ref Range    WBC 7.9 4.8 - 10.8 K/uL    RBC 4.64 4.20 - 5.40 M/uL    Hemoglobin 12.6 12.0 - 16.0 g/dL    Hematocrit 38.7 37.0 - 47.0 %    MCV 83.4 81.4 - 97.8 fL    MCH 27.2 27.0 - 33.0 pg    MCHC 32.6 (L) 33.6 - 35.0 g/dL    RDW 40.9 35.9 - 50.0 fL    Platelet Count 204 164 - 446 K/uL    MPV 11.0 9.0 - 12.9 fL    Neutrophils-Polys 55.10 44.00 - 72.00 %    Lymphocytes 35.30 22.00 - 41.00 %    Monocytes 5.10 0.00 - 13.40 %    Eosinophils 3.60 0.00 - 6.90 %    Basophils 0.80 0.00 - 1.80 %    Immature Granulocytes 0.10 0.00 - 0.90 %    Nucleated RBC 0.00 /100 WBC    Neutrophils (Absolute) 4.33 2.00 - 7.15 K/uL     Lymphs (Absolute) 2.77 1.00 - 4.80 K/uL    Monos (Absolute) 0.40 0.00 - 0.85 K/uL    Eos (Absolute) 0.28 0.00 - 0.51 K/uL    Baso (Absolute) 0.06 0.00 - 0.12 K/uL    Immature Granulocytes (abs) 0.01 0.00 - 0.11 K/uL    NRBC (Absolute) 0.00 K/uL   Complete Metabolic Panel (CMP)   Result Value Ref Range    Sodium 140 135 - 145 mmol/L    Potassium 4.0 3.6 - 5.5 mmol/L    Chloride 108 96 - 112 mmol/L    Co2 24 20 - 33 mmol/L    Anion Gap 8.0 7.0 - 16.0    Glucose 91 65 - 99 mg/dL    Bun 15 8 - 22 mg/dL    Creatinine 1.17 0.50 - 1.40 mg/dL    Calcium 8.7 8.4 - 10.2 mg/dL    AST(SGOT) 22 12 - 45 U/L    ALT(SGPT) 12 2 - 50 U/L    Alkaline Phosphatase 87 30 - 99 U/L    Total Bilirubin 0.2 0.1 - 1.5 mg/dL    Albumin 3.9 3.2 - 4.9 g/dL    Total Protein 6.3 6.0 - 8.2 g/dL    Globulin 2.4 1.9 - 3.5 g/dL    A-G Ratio 1.6 g/dL   Troponin STAT   Result Value Ref Range    Troponin T <6 6 - 19 ng/L   ESTIMATED GFR   Result Value Ref Range    GFR (CKD-EPI) 55 (A) >60 mL/min/1.73 m 2   EKG   Result Value Ref Range    Report       [facility]    Test Date:  2022  Pt Name:    ELSIE MARTINS                Department: [facility]  MRN:        [id number]                      Room:       -ROOM 2  Gender:     Female                       Technician: YADI  :        1965                   Requested By:LAZARUS BLUE  Order #:    [id number]                    Reading MD:    Measurements  Intervals                                Axis  Rate:       82                           P:          66  AZ:         161                          QRS:        40  QRSD:       106                          T:          64  QT:         385  QTc:        450    Interpretive Statements  Sinus rhythm  No previous ECG available for comparison       DX-CHEST-PORTABLE (1 VIEW)   Final Result         1.  No acute cardiopulmonary disease.   2.  Left lung base nodular density, location and appearance favors nipple shadow.  Follow-up evaluation with chest x-ray with nipple markers to exclude pulmonary nodule recommended.        Ekg;  nsr 82. No st elevation, no st depression, qtc 450.       If you have had any blood pressure issues while here in the emergency department, please see your doctor for a further evaluation or work up.    11:48 PM  The patient is nontoxic-appearing, afebrile and comfortable.  She did not have any chest pain, but rather palpitations.  Patient has exam consistent with anxiety secondary to feeling the palpitations and then starting to breathe fast, which caused her hands to go numb.  All of her symptoms are gone as of now.    Differential diagnoses include but not limited to: heart palpitations, anxiety, mi, pneumonia.    This patient presents with anxiety and heart palpitations  .  At this time, I have counseled the patient/family regarding their medications, pain control, and follow up.  They will continue their medications, if any, as prescribed.  They will return immediately for any worsening symptoms and/or any other medical concerns.  They will see their doctor, or contact the doctor provided, in 1-2 days for follow up.       FINAL IMPRESSION  Anxiety  Heart palpitations   Pulmonary nodule.       Electronically signed by: Fer Montoya D.O., 3/28/2022 11:05 PM

## 2022-04-01 ENCOUNTER — OFFICE VISIT (OUTPATIENT)
Dept: MEDICAL GROUP | Facility: MEDICAL CENTER | Age: 57
End: 2022-04-01
Attending: FAMILY MEDICINE
Payer: MEDICAID

## 2022-04-01 VITALS
DIASTOLIC BLOOD PRESSURE: 66 MMHG | RESPIRATION RATE: 16 BRPM | BODY MASS INDEX: 22.28 KG/M2 | WEIGHT: 147 LBS | HEART RATE: 80 BPM | HEIGHT: 68 IN | SYSTOLIC BLOOD PRESSURE: 90 MMHG | TEMPERATURE: 97.2 F | OXYGEN SATURATION: 97 %

## 2022-04-01 DIAGNOSIS — R00.2 PALPITATIONS: ICD-10-CM

## 2022-04-01 DIAGNOSIS — Z12.31 ENCOUNTER FOR SCREENING MAMMOGRAM FOR MALIGNANT NEOPLASM OF BREAST: ICD-10-CM

## 2022-04-01 DIAGNOSIS — Z12.11 SCREENING FOR COLON CANCER: ICD-10-CM

## 2022-04-01 DIAGNOSIS — R91.1 LUNG NODULE: ICD-10-CM

## 2022-04-01 DIAGNOSIS — Z11.59 NEED FOR HEPATITIS C SCREENING TEST: ICD-10-CM

## 2022-04-01 PROBLEM — T31.0 BURNS INVOLVING LESS THAN 10% OF BODY SURFACE: Status: RESOLVED | Noted: 2021-09-29 | Resolved: 2022-04-01

## 2022-04-01 PROCEDURE — 99214 OFFICE O/P EST MOD 30 MIN: CPT | Performed by: FAMILY MEDICINE

## 2022-04-01 PROCEDURE — 99213 OFFICE O/P EST LOW 20 MIN: CPT | Performed by: FAMILY MEDICINE

## 2022-04-01 ASSESSMENT — FIBROSIS 4 INDEX: FIB4 SCORE: 1.74

## 2022-04-01 NOTE — PROGRESS NOTES
Subjective:     CC:   Chief Complaint   Patient presents with   • Shortness of Breath   • Palpitations   • ED Follow-Up         HPI:     Palpitations  Pt reports she was not anxious and in a good mood  She felt out of breath and felt heart palpitations with hand numbness and tingling. Lasted for 30 minutes. She called 911 and was brought to the hospital where she had eval done that was negative and had an ativan.   This happened before when she was stung by a wasp and received epinephrine.   She also noted some bumps/hives the evening after she went to the ER.   She does report daily AM mild SOB that self resolves daily.   She was told it was an anxiety related issue, but she does not believe this.   She does report occasional episodes of palpitations a couple of times per week. She feels like her heart skips a beat when she is exercising.   She denies chest pain during those events.   Pt has about 10pack year history and quit 15 years ago.   No family history of heart problems.         History reviewed. No pertinent past medical history.    Social History     Tobacco Use   • Smoking status: Former Smoker     Packs/day: 0.50     Years: 10.00     Pack years: 5.00     Types: Cigarettes   • Smokeless tobacco: Never Used   Vaping Use   • Vaping Use: Never used   Substance Use Topics   • Alcohol use: Yes   • Drug use: Never       Current Outpatient Medications Ordered in Epic   Medication Sig Dispense Refill   • EPINEPHrine (EPIPEN) 0.3 MG/0.3ML Solution Auto-injector solution for injection Inject 0.3 mL into the shoulder, thigh, or buttocks one time as needed (anaphylaxis) for up to 1 dose. 1 Each 10   • therapeutic multivitamin-minerals (THERAGRAN-M) Tab Take 1 Tab by mouth every day. (Patient not taking: No sig reported)       No current [facility]-ordered facility-administered medications on file.       Allergies:  Wasp venom      Objective:       Exam:  BP (!) 90/66   Pulse 80   Temp 36.2 °C (97.2 °F) (Temporal)   Resp  "16   Ht 1.727 m (5' 7.99\")   Wt 66.7 kg (147 lb)   SpO2 97%   BMI 22.36 kg/m²  Body mass index is 22.36 kg/m².    General: Normal appearing. No distress.  Pulmonary: Clear to ausculation.  Normal effort. No rales, ronchi, or wheezing.  Cardiovascular: Regular rate and rhythm without murmur. No LE edema  Skin: Warm and dry.  No obvious lesions.  Musculoskeletal: Normal gait. No extremity cyanosis, clubbing, or edema.  Psych: Normal mood and affect. Alert and oriented x3. Judgment and insight is normal.      Labs:   Reviewed labs from ER visit -  mildly decreased GFR, 1 view chest x-ray showing possible left lower lung nodule, likely a nipple shadow    Assessment & Plan:     56 y.o. female with the following -     1. Palpitations  - HOLTER - Cardiology Performed (48HR); Future  - REFERRAL TO CARDIOLOGY  - Lipid Profile; Future  - HEMOGLOBIN A1C; Future  - TSH WITH REFLEX TO FT4; Future  I Holter monitor and referral to cardiology for further evaluation and patient is rather concerned about this issue.  Risks stratify with lipid panel and A1c.  Also checking thyroid function.    2. Lung nodule  - DX-CHEST-2 VIEWS; Future  Repeat 2 view chest x-ray    3. Need for hepatitis C screening test  - HEP C VIRUS ANTIBODY; Future    4. Screening for colon cancer  - OCCULT BLOOD FECES IMMUNOASSAY; Future    5. Encounter for screening mammogram for malignant neoplasm of breast  - MA-SCREENING MAMMO BILAT W/TOMOSYNTHESIS W/CAD; Future      Return in about 11 days (around 4/12/2022) for pap smear.   Patient had abnormal Pap smear 1 year ago that was positive for HPV.  Repeat this year, she will return on April 12 for this.  Patient also get Tdap at her next visit.    Please note that this dictation was created using voice recognition software. I have made every reasonable attempt to correct obvious errors, but I expect that there are errors of grammar and possibly content that I did not discover before finalizing the note.      "

## 2022-04-01 NOTE — ASSESSMENT & PLAN NOTE
Pt reports she was not anxious and in a good mood  She felt out of breath and felt heart palpitations with hand numbness and tingling. Lasted for 30 minutes. She called 911 and was brought to the hospital where she had eval done that was negative and had an ativan.   This happened before when she was stung by a wasp and received epinephrine.   She also noted some bumps/hives the evening after she went to the ER.   She does report daily AM mild SOB that self resolves daily.   She was told it was an anxiety related issue, but she does not believe this.   She does report occasional episodes of palpitations a couple of times per week. She feels like her heart skips a beat when she is exercising.   She denies chest pain during those events.   Pt has about 10pack year history and quit 15 years ago.   No family history of heart problems.

## 2022-04-13 ENCOUNTER — NON-PROVIDER VISIT (OUTPATIENT)
Dept: CARDIOLOGY | Facility: MEDICAL CENTER | Age: 57
End: 2022-04-13
Attending: FAMILY MEDICINE
Payer: MEDICAID

## 2022-04-13 ENCOUNTER — TELEPHONE (OUTPATIENT)
Dept: CARDIOLOGY | Facility: MEDICAL CENTER | Age: 57
End: 2022-04-13

## 2022-04-13 DIAGNOSIS — I47.10 SVT (SUPRAVENTRICULAR TACHYCARDIA) (HCC): ICD-10-CM

## 2022-04-13 DIAGNOSIS — R00.2 PALPITATIONS: ICD-10-CM

## 2022-04-13 DIAGNOSIS — I49.3 PVC (PREMATURE VENTRICULAR CONTRACTION): ICD-10-CM

## 2022-04-13 NOTE — PROGRESS NOTES
>Patient enrolled in the 7 day [employer] Heart Monitoring program per Kayla Rincon.    >In office hookup at [facility] with [employer].    >Pending EOS    Serial number: [id number]
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
## 2022-04-19 ENCOUNTER — TELEPHONE (OUTPATIENT)
Dept: CARDIOLOGY | Facility: MEDICAL CENTER | Age: 57
End: 2022-04-19
Payer: MEDICAID

## 2022-04-19 NOTE — TELEPHONE ENCOUNTER
Lynda Resendez, the nurse at the [facility]'s office called in and had some questions regarding the pt's [employer] monitor that the pt is wearing.      Best contact for Lynda, nurse:  PH: [phone]    Thank you,  Brittney CHÁVEZ

## 2022-04-25 ENCOUNTER — TELEPHONE (OUTPATIENT)
Dept: CARDIOLOGY | Facility: MEDICAL CENTER | Age: 57
End: 2022-04-25
Payer: MEDICAID

## 2022-04-25 PROCEDURE — 93268 ECG RECORD/REVIEW: CPT | Performed by: INTERNAL MEDICINE

## 2022-05-31 ENCOUNTER — OFFICE VISIT (OUTPATIENT)
Dept: MEDICAL GROUP | Facility: MEDICAL CENTER | Age: 57
End: 2022-05-31
Attending: FAMILY MEDICINE
Payer: MEDICAID

## 2022-05-31 VITALS
WEIGHT: 141.3 LBS | OXYGEN SATURATION: 97 % | RESPIRATION RATE: 16 BRPM | HEART RATE: 80 BPM | TEMPERATURE: 97.2 F | SYSTOLIC BLOOD PRESSURE: 110 MMHG | HEIGHT: 68 IN | BODY MASS INDEX: 21.41 KG/M2 | DIASTOLIC BLOOD PRESSURE: 66 MMHG

## 2022-05-31 DIAGNOSIS — R93.89 ABNORMAL CHEST X-RAY: ICD-10-CM

## 2022-05-31 PROCEDURE — 99213 OFFICE O/P EST LOW 20 MIN: CPT | Performed by: FAMILY MEDICINE

## 2022-05-31 ASSESSMENT — FIBROSIS 4 INDEX: FIB4 SCORE: 1.74
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
## 2022-06-07 ENCOUNTER — HOSPITAL ENCOUNTER (OUTPATIENT)
Dept: RADIOLOGY | Facility: MEDICAL CENTER | Age: 57
End: 2022-06-07
Attending: FAMILY MEDICINE
Payer: MEDICAID

## 2022-06-07 DIAGNOSIS — R93.89 ABNORMAL CHEST X-RAY: ICD-10-CM

## 2022-06-07 DIAGNOSIS — Z12.31 ENCOUNTER FOR SCREENING MAMMOGRAM FOR MALIGNANT NEOPLASM OF BREAST: ICD-10-CM

## 2022-06-07 PROCEDURE — 77063 BREAST TOMOSYNTHESIS BI: CPT

## 2022-06-07 PROCEDURE — 71046 X-RAY EXAM CHEST 2 VIEWS: CPT

## 2022-06-08 ENCOUNTER — TELEPHONE (OUTPATIENT)
Dept: MEDICAL GROUP | Facility: MEDICAL CENTER | Age: 57
End: 2022-06-08
Payer: MEDICAID

## 2022-06-08 DIAGNOSIS — R92.8 ABNORMAL SCREENING MAMMOGRAM: ICD-10-CM

## 2022-06-24 ENCOUNTER — APPOINTMENT (OUTPATIENT)
Dept: RADIOLOGY | Facility: MEDICAL CENTER | Age: 57
End: 2022-06-24
Attending: FAMILY MEDICINE
Payer: MEDICAID

## 2022-06-28 ENCOUNTER — OFFICE VISIT (OUTPATIENT)
Dept: MEDICAL GROUP | Facility: MEDICAL CENTER | Age: 57
End: 2022-06-28
Attending: FAMILY MEDICINE
Payer: MEDICAID

## 2022-06-28 ENCOUNTER — HOSPITAL ENCOUNTER (OUTPATIENT)
Facility: MEDICAL CENTER | Age: 57
End: 2022-06-28
Attending: FAMILY MEDICINE
Payer: MEDICAID

## 2022-06-28 VITALS
SYSTOLIC BLOOD PRESSURE: 100 MMHG | WEIGHT: 139.3 LBS | DIASTOLIC BLOOD PRESSURE: 68 MMHG | RESPIRATION RATE: 16 BRPM | OXYGEN SATURATION: 97 % | BODY MASS INDEX: 20.63 KG/M2 | TEMPERATURE: 98.1 F | HEART RATE: 89 BPM | HEIGHT: 69 IN

## 2022-06-28 DIAGNOSIS — R87.618 PAP SMEAR ABNORMALITY OF CERVIX/HUMAN PAPILLOMAVIRUS (HPV) POSITIVE: ICD-10-CM

## 2022-06-28 DIAGNOSIS — Z00.00 HEALTHCARE MAINTENANCE: ICD-10-CM

## 2022-06-28 DIAGNOSIS — B35.1 ONYCHOMYCOSIS: ICD-10-CM

## 2022-06-28 DIAGNOSIS — Z12.11 SCREENING FOR COLON CANCER: ICD-10-CM

## 2022-06-28 PROCEDURE — 88175 CYTOPATH C/V AUTO FLUID REDO: CPT

## 2022-06-28 PROCEDURE — 87591 N.GONORRHOEAE DNA AMP PROB: CPT

## 2022-06-28 PROCEDURE — G0101 CA SCREEN;PELVIC/BREAST EXAM: HCPCS | Performed by: FAMILY MEDICINE

## 2022-06-28 PROCEDURE — 99213 OFFICE O/P EST LOW 20 MIN: CPT | Mod: 25 | Performed by: FAMILY MEDICINE

## 2022-06-28 PROCEDURE — 87491 CHLMYD TRACH DNA AMP PROBE: CPT

## 2022-06-28 PROCEDURE — 87624 HPV HI-RISK TYP POOLED RSLT: CPT

## 2022-06-28 RX ORDER — TERBINAFINE HYDROCHLORIDE 250 MG/1
250 TABLET ORAL DAILY
Qty: 84 TABLET | Refills: 0 | Status: SHIPPED | OUTPATIENT
Start: 2022-06-28

## 2022-06-28 ASSESSMENT — FIBROSIS 4 INDEX: FIB4 SCORE: 1.74

## 2022-06-28 ASSESSMENT — PATIENT HEALTH QUESTIONNAIRE - PHQ9: CLINICAL INTERPRETATION OF PHQ2 SCORE: 0
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
## 2022-06-30 ENCOUNTER — HOSPITAL ENCOUNTER (OUTPATIENT)
Dept: RADIOLOGY | Facility: MEDICAL CENTER | Age: 57
End: 2022-06-30
Attending: FAMILY MEDICINE
Payer: MEDICAID

## 2022-06-30 DIAGNOSIS — R92.8 ABNORMAL SCREENING MAMMOGRAM: ICD-10-CM

## 2022-06-30 PROCEDURE — G0279 TOMOSYNTHESIS, MAMMO: HCPCS

## 2022-07-01 ENCOUNTER — PATIENT MESSAGE (OUTPATIENT)
Dept: MEDICAL GROUP | Facility: MEDICAL CENTER | Age: 57
End: 2022-07-01
Payer: MEDICAID

## 2022-07-01 DIAGNOSIS — R87.618 PAP SMEAR ABNORMALITY OF CERVIX/HUMAN PAPILLOMAVIRUS (HPV) POSITIVE: ICD-10-CM

## 2022-07-01 NOTE — TELEPHONE ENCOUNTER
----- Message from Mckenzie Garza M.D. sent at 7/1/2022 10:22 AM PDT -----  Please let patient know that her mmamogram was normal.

## 2022-07-01 NOTE — PATIENT COMMUNICATION
Phone Number Called: [phone] (home)     Call outcome: Did not leave a detailed message. Requested patient to call back.    Message: Attempted to notify pt of MAMMO results. No answer, LVM requesting a c/b.

## 2022-07-01 NOTE — TELEPHONE ENCOUNTER
----- Message from Mckenzie Garza M.D. sent at 7/1/2022 10:20 AM PDT -----  Pt doesn't check [employer] messaging.  Please advise her that her pap smear showed that she continues to have HPV, human papilloma virus, which causes cervical cancer. The cell sample was normal. However since the infection is persisting from 1 year ago, it is indicated to get a biopsy. I have placed a referral to gynecology. If she has an gynecologist she should make an appointment.

## 2022-07-01 NOTE — PATIENT COMMUNICATION
Phone Number Called: [phone] (home)       Call outcome: Did not leave a detailed message. Requested patient to call back.    Message: LVM asking for a c/b about labs. Also sent message informing patient of lab results.

## 2022-07-13 ENCOUNTER — TELEPHONE (OUTPATIENT)
Dept: CARDIOLOGY | Facility: MEDICAL CENTER | Age: 57
End: 2022-07-13
Payer: MEDICAID

## 2022-07-13 NOTE — TELEPHONE ENCOUNTER
Chart Prep    S/W patient in regards to requesting records for NP appt with Dr. Lan.  Patient has not been seen by a prior cardiologist, no cardiac testing/imaging done outside of [facility] and labs are most recent in Epic.  Patient verbally confirmed time/date/location of appt.

## 2022-07-22 ENCOUNTER — OFFICE VISIT (OUTPATIENT)
Dept: CARDIOLOGY | Facility: MEDICAL CENTER | Age: 57
End: 2022-07-22
Payer: MEDICAID

## 2022-07-22 VITALS
RESPIRATION RATE: 18 BRPM | OXYGEN SATURATION: 97 % | BODY MASS INDEX: 20.29 KG/M2 | WEIGHT: 137 LBS | HEART RATE: 77 BPM | SYSTOLIC BLOOD PRESSURE: 110 MMHG | DIASTOLIC BLOOD PRESSURE: 62 MMHG | HEIGHT: 69 IN

## 2022-07-22 DIAGNOSIS — R00.2 PALPITATIONS: Primary | ICD-10-CM

## 2022-07-22 LAB — EKG IMPRESSION: NORMAL

## 2022-07-22 PROCEDURE — 93000 ELECTROCARDIOGRAM COMPLETE: CPT | Performed by: INTERNAL MEDICINE

## 2022-07-22 PROCEDURE — 99244 OFF/OP CNSLTJ NEW/EST MOD 40: CPT | Mod: 25 | Performed by: INTERNAL MEDICINE

## 2022-07-22 RX ORDER — ASPIRIN 81 MG/1
81 TABLET, CHEWABLE ORAL
COMMUNITY

## 2022-07-22 ASSESSMENT — ENCOUNTER SYMPTOMS
DIAPHORESIS: 0
SYNCOPE: 1
SLEEP DISTURBANCES DUE TO BREATHING: 0
NUMBNESS: 0
BLOATING: 0
PARESTHESIAS: 1
DIARRHEA: 0
FALLS: 0
IRREGULAR HEARTBEAT: 0
DOUBLE VISION: 1
NAUSEA: 0
BLURRED VISION: 0
PND: 0
SORE THROAT: 1
FEVER: 0
DYSPNEA ON EXERTION: 0
PALPITATIONS: 1
CONSTIPATION: 0
NIGHT SWEATS: 0
BRUISES/BLEEDS EASILY: 1
WEAKNESS: 0
LIGHT-HEADEDNESS: 0
EXCESSIVE DAYTIME SLEEPINESS: 0
DECREASED APPETITE: 0
SHORTNESS OF BREATH: 1
BACK PAIN: 1
MYALGIAS: 1
DIZZINESS: 1
ORTHOPNEA: 1
VOMITING: 0
COUGH: 0
HEADACHES: 1
FLANK PAIN: 0
WHEEZING: 1
NEAR-SYNCOPE: 0
LOSS OF BALANCE: 0

## 2022-07-22 ASSESSMENT — FIBROSIS 4 INDEX: FIB4 SCORE: 1.74
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
## 2022-07-29 ENCOUNTER — HOSPITAL ENCOUNTER (OUTPATIENT)
Dept: CARDIOLOGY | Facility: MEDICAL CENTER | Age: 57
End: 2022-07-29
Attending: INTERNAL MEDICINE
Payer: MEDICAID

## 2022-07-29 DIAGNOSIS — R00.2 PALPITATIONS: ICD-10-CM

## 2022-07-29 PROCEDURE — 93306 TTE W/DOPPLER COMPLETE: CPT

## 2022-08-01 LAB
LV EJECT FRACT  99904: 65
LV EJECT FRACT MOD 2C 99903: 58.61
LV EJECT FRACT MOD 4C 99902: 72.19
LV EJECT FRACT MOD BP 99901: 66.62

## 2022-08-01 PROCEDURE — 93306 TTE W/DOPPLER COMPLETE: CPT | Mod: 26 | Performed by: INTERNAL MEDICINE

## 2023-02-01 NOTE — ASSESSMENT & PLAN NOTE
Pt reports b/l elbow pain since the end of march  She states she has been doing some extra stretches since then, but it has not been helping. Bicep curls without weights.   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 Does report a burning sensation on ulnar side of forearm   No pain above the elbows  This has never happened to her before      No